# Patient Record
Sex: MALE | Race: WHITE | URBAN - METROPOLITAN AREA
[De-identification: names, ages, dates, MRNs, and addresses within clinical notes are randomized per-mention and may not be internally consistent; named-entity substitution may affect disease eponyms.]

---

## 2017-02-16 ENCOUNTER — OFFICE VISIT (OUTPATIENT)
Dept: FAMILY MEDICINE | Facility: CLINIC | Age: 32
End: 2017-02-16
Payer: COMMERCIAL

## 2017-02-16 VITALS
HEIGHT: 71 IN | DIASTOLIC BLOOD PRESSURE: 80 MMHG | TEMPERATURE: 97 F | HEART RATE: 70 BPM | BODY MASS INDEX: 35.45 KG/M2 | SYSTOLIC BLOOD PRESSURE: 150 MMHG | OXYGEN SATURATION: 99 % | WEIGHT: 253.2 LBS

## 2017-02-16 DIAGNOSIS — E66.01 MORBID OBESITY DUE TO EXCESS CALORIES (H): ICD-10-CM

## 2017-02-16 DIAGNOSIS — Z23 NEED FOR PROPHYLACTIC VACCINATION AND INOCULATION AGAINST INFLUENZA: ICD-10-CM

## 2017-02-16 DIAGNOSIS — I10 BENIGN ESSENTIAL HYPERTENSION: Primary | ICD-10-CM

## 2017-02-16 DIAGNOSIS — Z72.0 TOBACCO ABUSE: ICD-10-CM

## 2017-02-16 PROCEDURE — 90471 IMMUNIZATION ADMIN: CPT | Performed by: NURSE PRACTITIONER

## 2017-02-16 PROCEDURE — 90686 IIV4 VACC NO PRSV 0.5 ML IM: CPT | Performed by: NURSE PRACTITIONER

## 2017-02-16 PROCEDURE — 99213 OFFICE O/P EST LOW 20 MIN: CPT | Mod: 25 | Performed by: NURSE PRACTITIONER

## 2017-02-16 RX ORDER — LISINOPRIL AND HYDROCHLOROTHIAZIDE 12.5; 2 MG/1; MG/1
1 TABLET ORAL DAILY
Qty: 30 TABLET | Refills: 0 | Status: SHIPPED | OUTPATIENT
Start: 2017-02-16

## 2017-02-16 NOTE — NURSING NOTE
"Chief Complaint   Patient presents with     Hypertension     Recheck       Initial /80 (BP Location: Right arm, Cuff Size: Adult Large)  Pulse 70  Temp 97  F (36.1  C) (Oral)  Ht 5' 11.14\" (1.807 m)  Wt 253 lb 3.2 oz (114.9 kg)  SpO2 99%  BMI 35.17 kg/m2 Estimated body mass index is 35.17 kg/(m^2) as calculated from the following:    Height as of this encounter: 5' 11.14\" (1.807 m).    Weight as of this encounter: 253 lb 3.2 oz (114.9 kg).  Medication Reconciliation: complete  An AMANDA Riggins    "

## 2017-02-16 NOTE — PROGRESS NOTES
Injectable Influenza Immunization Documentation    1.  Is the person to be vaccinated sick today?  No    2. Does the person to be vaccinated have an allergy to eggs or to a component of the vaccine?  No    3. Has the person to be vaccinated today ever had a serious reaction to influenza vaccine in the past?  No    4. Has the person to be vaccinated ever had Guillain-Dix syndrome?  No     Form completed by Amber Riggins MA

## 2017-02-16 NOTE — MR AVS SNAPSHOT
After Visit Summary   2/16/2017    Osmel Cordova    MRN: [id number]           Patient Information     Date Of Birth          1985        Visit Information        Provider Department      2/16/2017 10:20 AM Marifer Bowen APRN [facility]        Today's Diagnoses     Benign essential hypertension    -  1    Tobacco abuse          Care Instructions    -Look at the Chantix and Zyban websites, also look at [email or web address]  -check out the Novu website    [facility]    If you have any questions regarding to your visit please contact your care team:       Team Red:   Clinic Hours Telephone Number   Dr. Keiry Bowen, NP   7am-7pm  Monday - Thursday   7am-5pm  Fridays  [phone]  (Appointment scheduling available 24/7)    Questions about your visit?   Team Line  [phone]   [facility] [place] [facility] 11am-9pm Monday-Friday Saturday-Sunday- 9am-5pm   [place] - 5pm-9pm Monday-Friday Saturday-Sunday- 9am-5pm  [phone] - [facility]  [phone] - [place]       What options do I have for visits at the clinic other than the traditional office visit?  To expand how we care for you, many of our providers are utilizing electronic visits (e-visits) and telephone visits, when medically appropriate, for interactions with their patients rather than a visit in the clinic.   We also offer nurse visits for many medical concerns. Just like any other service, we will bill your insurance company for this type of visit based on time spent on the phone with your provider. Not all insurance companies cover these visits. Please check with your medical insurance if this type of visit is covered. You will be responsible for any charges that are not paid by your insurance.      E-visits via [employer]:  generally incur a $35.00 fee.  Telephone visits:  Time spent on the phone: *charged based on time  that is spent on the phone in increments of 10 minutes. Estimated cost:   5-10 mins $30.00   11-20 mins. $59.00   21-30 mins. $85.00     Use [employer] (secure email communication and access to your chart) to send your primary care provider a message or make an appointment. Ask someone on your Team how to sign up for [employer].  For a Price Quote for your services, please call our [employer] Line at [phone].      As always, Thank you for trusting us with your health care needs!  Discharged By: An          Follow-ups after your visit        Additional Services     TOBACCO CESSATION REFERRAL (GROUP VISITS, COACHING, ONLINE)       Your provider has referred you for:   Online Wellness Program (Similar programs 12% Quit Rate) : [email or web address]    Please be aware that coverage of these services is subject to the terms and limitations of your health insurance plan.  Call member services at your health plan with any benefit or coverage questions.      Please bring the following to your appointment:    >>   Any x-rays, CTs or MRIs which have been performed.  Contact the facility where they were done to arrange for  prior to your scheduled appointment.  Any new CT, MRI or other procedures ordered by your specialist must be performed at a [facility] facility or coordinated by your clinic's referral office.    >>   List of current medications   >>   This referral request   >>   Any documents/labs given to you for this referral                  Who to contact     If you have questions or need follow up information about today's clinic visit or your schedule please contact [facility] directly at [phone].  Normal or non-critical lab and imaging results will be communicated to you by MyChart, letter or phone within 4 business days after the clinic has received the results. If you do not hear from us within 7 days, please contact the clinic through MyChart or phone. If you have a critical or  "abnormal lab result, we will notify you by phone as soon as possible.  Submit refill requests through [email or web address] or call your pharmacy and they will forward the refill request to us. Please allow 3 business days for your refill to be completed.          Additional Information About Your Visit        MyChart Information     [email or web address] lets you send messages to your doctor, view your test results, renew your prescriptions, schedule appointments and more. To sign up, go to [email or web address] . Click on \"Log in\" on the left side of the screen, which will take you to the Welcome page. Then click on \"Sign up Now\" on the right side of the page.     You will be asked to enter the access code listed below, as well as some personal information. Please follow the directions to create your username and password.     Your access code is: [email or web address]  Expires: 2017 10:48 AM     Your access code will  in 90 days. If you need help or a new code, please call your [place] clinic or [phone].        Care EveryWhere ID     This is your Care EveryWhere ID. This could be used by other organizations to access your [place] medical records  [id number]        Your Vitals Were     Pulse Temperature Height Pulse Oximetry BMI (Body Mass Index)       70 97  F (36.1  C) (Oral) 5' 11.14\" (1.807 m) 99% 35.17 kg/m2        Blood Pressure from Last 3 Encounters:   17 172/80   16 146/90   16 140/78    Weight from Last 3 Encounters:   17 253 lb 3.2 oz (114.9 kg)   16 (!) 320 lb (145.2 kg)   16 (!) 315 lb (142.9 kg)              We Performed the Following     TOBACCO CESSATION REFERRAL (GROUP VISITS, COACHING, ONLINE)          Where to get your medicines      These medications were sent to [facility] [address] [place], [place] - [address]  [address], [place] [address]    Hours:  Test fax successful 02   Phone:  [phone]     " lisinopril-hydrochlorothiazide 20-12.5 MG per tablet          Primary Care Provider Office Phone # Fax #    Shu Moreau [phone]       [address] [address]        Thank you!     Thank you for choosing [facility]  for your care. Our goal is always to provide you with excellent care. Hearing back from our patients is one way we can continue to improve our services. Please take a few minutes to complete the written survey that you may receive in the mail after your visit with us. Thank you!             Your Updated Medication List - Protect others around you: Learn how to safely use, store and throw away your medicines at www.disposemymeds.org.          This list is accurate as of: 2/16/17 10:48 AM.  Always use your most recent med list.                   Brand Name Dispense Instructions for use    lisinopril-hydrochlorothiazide 20-12.5 MG per tablet    PRINZIDE/ZESTORETIC    30 tablet    Take 1 tablet by mouth daily       mometasone 0.1 % cream    ELOCON    90 g    Apply sparingly to affected area three x  daily as needed.  Do not apply to face.

## 2017-02-16 NOTE — PATIENT INSTRUCTIONS
-Look at the Chantix and Zyban websites, also look at Quitplan.com  -check out the Novu website    [facility]    If you have any questions regarding to your visit please contact your care team:       Team Red:   Clinic Hours Telephone Number   Dr. Keiry Bowen, NP   7am-7pm  Monday - Thursday   7am-5pm  Fridays  [phone]  (Appointment scheduling available 24/7)    Questions about your visit?   Team Line  [phone]   Urgent Care - [place] - 11am-9pm Monday-Friday Saturday-Sunday- 9am-5pm   [place] - 5pm-9pm Monday-Friday Saturday-Sunday- 9am-5pm  [phone] - [place]   [phone] - [place]       What options do I have for visits at the clinic other than the traditional office visit?  To expand how we care for you, many of our providers are utilizing electronic visits (e-visits) and telephone visits, when medically appropriate, for interactions with their patients rather than a visit in the clinic.   We also offer nurse visits for many medical concerns. Just like any other service, we will bill your insurance company for this type of visit based on time spent on the phone with your provider. Not all insurance companies cover these visits. Please check with your medical insurance if this type of visit is covered. You will be responsible for any charges that are not paid by your insurance.      E-visits via [employer]:  generally incur a $35.00 fee.  Telephone visits:  Time spent on the phone: *charged based on time that is spent on the phone in increments of 10 minutes. Estimated cost:   5-10 mins $30.00   11-20 mins. $59.00   21-30 mins. $85.00     Use [employer] (secure email communication and access to your chart) to send your primary care provider a message or make an appointment. Ask someone on your Team how to sign up for [employer].  For a Price Quote for your services, please call our Consumer Price Line at  [phone].      As always, Thank you for trusting us with your health care needs!  Discharged By: An

## 2017-03-20 ENCOUNTER — TELEPHONE (OUTPATIENT)
Dept: FAMILY MEDICINE | Facility: CLINIC | Age: 32
End: 2017-03-20

## 2017-03-20 DIAGNOSIS — I10 BENIGN ESSENTIAL HYPERTENSION: ICD-10-CM

## 2017-03-20 NOTE — LETTER
[address] [place] [address]    March 28, 2017    Osmel Cordova  [address] [facility] [address]      Dear Osmel,    We have been unsuccessful reaching you by telephone. You are do for a follow up office visit regarding your lisinopril-hydrochlorothiazide (PRINZIDE/ZESTORETIC) 20-12.5 MG per tablet medication with me. To get more refills please schedule this one of a few ways. First using My Chart Scheduling, second is calling our Main number [phone] this line is open for scheduling 24 hours 7 days a week.       Sincerely,        Marifer Bowen CNP/dt

## 2017-03-21 NOTE — TELEPHONE ENCOUNTER
lisinopril-hydrochlorothiazide (PRINZIDE/ZESTORETIC) 20-12.5 MG per tablet      Last Written Prescription Date: 2/16/17  Last Fill Quantity: 30, # refills: 0  Last Office Visit with [facility], [facility] or [facility] prescribing provider: 2/16/17       No results found for: POTASSIUM  No results found for: CR  BP Readings from Last 3 Encounters:   02/16/17 150/80   06/17/16 146/90   04/19/16 140/78

## 2017-03-22 NOTE — TELEPHONE ENCOUNTER
Routing refill request to provider for review/approval because:  Labs out of range:  Last two BP readings not within gaol.  Patient needs to be seen because:  Per last OV on 2/16/17 for patient to F/U with PCP in 2-4 weeks.    Xu KITCHEN, RN, BSN

## 2017-03-23 RX ORDER — LISINOPRIL AND HYDROCHLOROTHIAZIDE 12.5; 2 MG/1; MG/1
TABLET ORAL
Qty: 30 TABLET | Refills: 0
Start: 2017-03-23

## 2017-03-23 NOTE — TELEPHONE ENCOUNTER
Left message for patient to return call to the RN hotline number at [phone].  Marifer Rodriguez RN

## 2017-03-24 NOTE — TELEPHONE ENCOUNTER
2nd attempt - Message left for patient to call the RN hotline # at [phone]    Xu KITCHEN RN, BSN

## 2017-09-27 ENCOUNTER — TELEPHONE (OUTPATIENT)
Dept: FAMILY MEDICINE | Facility: CLINIC | Age: 32
End: 2017-09-27

## 2017-09-27 NOTE — LETTER
October 9, 2017          Osmel Cordova,  [address] [facility] [address]        Dear Osmel Cordova      Monitoring and managing your preventative and chronic health conditions are very important to us. Our records indicate that you have not scheduled for a Blood Pressure Check Appointment  which was recommended by Marifer Bowen.      If you have received your health care elsewhere, please call the clinic so the information can be documented in your chart.    Please call [phone] or message us through your [employer] account to schedule an appointment or provide information for your chart.     Feel free to contact us if you have any questions or concerns!    I look forward to seeing you and working with you on your health care needs.     Sincerely,         Marifer Bowen / jackie

## 2017-09-27 NOTE — TELEPHONE ENCOUNTER
Panel Management Review      Patient has the following on his problem list:     Hypertension   Last three blood pressure readings:  BP Readings from Last 3 Encounters:   02/16/17 150/80   06/17/16 146/90   04/19/16 140/78     Blood pressure: FAILED    HTN Guidelines:  Age 18-59 BP range:  Less than 140/90  Age 60-85 with Diabetes:  Less than 140/90  Age 60-85 without Diabetes:  less than 150/90        Composite cancer screening  Chart review shows that this patient is due/due soon for the following None  Summary:    Patient is due/failing the following:   BP CHECK    Action needed:   Routed to provider for review.    Type of outreach:    None, routed to provider for review.    Questions for provider review:    None                                                                                                                                    Stacy Magana MA       Chart routed to Provider .

## 2017-09-29 NOTE — TELEPHONE ENCOUNTER
Called patient and was told that Osmel will not be around until the first on January. Is he okay to wait until January? Please advise.     Ade Garcia MA

## 2017-09-29 NOTE — TELEPHONE ENCOUNTER
I recommend follow up in the next month- if he is out of town, would recommend seeing another provider rather than waiting until January.  Marifer Bowen, CNP

## 2018-01-31 ENCOUNTER — TELEPHONE (OUTPATIENT)
Dept: FAMILY MEDICINE | Facility: CLINIC | Age: 33
End: 2018-01-31

## 2018-01-31 NOTE — LETTER
January 31, 2018          Osmel Cordova,  [address] [facility] [address]        Dear Osmel Cordova      Monitoring and managing your preventative and chronic health conditions are very important to us. Our records indicate that you have not scheduled for a blood pressure check which was recommended by Marifer Bowen      If you have received your health care elsewhere, please call the clinic so the information can be documented in your chart.    Please call [phone] or message us through your [employer] account to schedule an appointment or provide information for your chart.     Feel free to contact us if you have any questions or concerns!    I look forward to seeing you and working with you on your health care needs.     Sincerely,         Marifer Bowen / jackie

## 2018-01-31 NOTE — TELEPHONE ENCOUNTER
Panel Management Review      Patient has the following on his problem list:     Hypertension   Last three blood pressure readings:  BP Readings from Last 3 Encounters:   02/16/17 150/80   06/17/16 146/90   04/19/16 140/78     Blood pressure: FAILED    HTN Guidelines:  Age 18-59 BP range:  Less than 140/90  Age 60-85 with Diabetes:  Less than 140/90  Age 60-85 without Diabetes:  less than 150/90      Composite cancer screening  Chart review shows that this patient is due/due soon for the following None  Summary:    Patient is due/failing the following:   BP CHECK    Action needed:   Patient needs office visit for blood pressure check.    Type of outreach:    Sent letter.    Questions for provider review:    None                                                                                                                                    Stacy Magana MA

## 2019-11-26 NOTE — TELEPHONE ENCOUNTER
TC - please mail out letter as we are not able to get hold of patient via multiple telephone calls.    Xu KITCHEN RN, BSN     88

## 2023-10-26 NOTE — PROGRESS NOTES
Review Flowsheet  More data exists       10/26/2023   PHQ 2/9 Score   Adult PHQ 2 Score 0   Adult PHQ 2 Interpretation No further screening needed   Little interest or pleasure in activity? Not at all   Feeling down, depressed or hopeless? Not at all       DEPRESSION ASSESSMENT/PLAN:  Depression screening is negative no further plan needed.   "  SUBJECTIVE:                                                    Osmel Cordova is a 31 year old male who presents to clinic today for the following health issues:      Hypertension Follow-up      Outpatient blood pressures are not being checked.    Low Salt Diet: not monitoring salt       Amount of exercise or physical activity: 6-7 days/week for an average of greater than 60 minutes    Problems taking medications regularly: n/a    Medication side effects: none  Diet: regular (no restrictions)    Off all medications, lost insurance.  No HA, CP, or SOB.  Has lost 70 lbs through diet/exercise. Denies polyuria, polydipsia, polyphagia.    Wonders if the WD-40 his dad is using could be causing allergies.      Problem list and histories reviewed & adjusted, as indicated.  Additional history: as documented    Problem list, Medication list, Allergies, and Medical/Social/Surgical histories reviewed in EPIC and updated as appropriate.    ROS:  Constitutional, HEENT, cardiovascular, pulmonary systems are negative, except as otherwise noted.    OBJECTIVE:                                                    /80  Pulse 70  Temp 97  F (36.1  C) (Oral)  Ht 5' 11.14\" (1.807 m)  Wt 253 lb 3.2 oz (114.9 kg)  SpO2 99%  BMI 35.17 kg/m2  Body mass index is 35.17 kg/(m^2).  GENERAL: healthy, alert and no distress  RESP: lungs clear to auscultation - no rales, rhonchi or wheezes  CV: regular rate and rhythm, normal S1 S2, no S3 or S4, no murmur, click or rub, no peripheral edema and peripheral pulses strong    Diagnostic Test Results:  none      ASSESSMENT/PLAN:                                                        1. Benign essential hypertension  Uncontrolled. Restart Prinzide, needs follow up with labs in 2-4 weeks. Needs diabetes screening than also.  - lisinopril-hydrochlorothiazide (PRINZIDE/ZESTORETIC) 20-12.5 MG per tablet; Take 1 tablet by mouth daily  Dispense: 30 tablet; Refill: 0    2. Tobacco abuse  Interested in " online resources- referred to joel.  - TOBACCO CESSATION REFERRAL (GROUP VISITS, COACHING, ONLINE)    3. Need for prophylactic vaccination and inoculation against influenza    - FLU VAC, SPLIT VIRUS IM > 3 YO (QUADRIVALENT) [45410]  - Vaccine Administration, Initial [42845]      4. Morbid obesity due to excess calories (H)  Encouraged continued lifestyle changes    Follow up 2-4 weeks with Primary care provider or myself    MARY GRACE Boyle [facility]